# Patient Record
Sex: FEMALE | Race: WHITE | URBAN - METROPOLITAN AREA
[De-identification: names, ages, dates, MRNs, and addresses within clinical notes are randomized per-mention and may not be internally consistent; named-entity substitution may affect disease eponyms.]

---

## 2021-11-17 ENCOUNTER — OFFICE VISIT (OUTPATIENT)
Dept: BARIATRICS/WEIGHT MGMT | Age: 56
End: 2021-11-17
Payer: MEDICARE

## 2021-11-17 VITALS
DIASTOLIC BLOOD PRESSURE: 89 MMHG | SYSTOLIC BLOOD PRESSURE: 150 MMHG | WEIGHT: 103 LBS | BODY MASS INDEX: 16.17 KG/M2 | HEART RATE: 89 BPM | HEIGHT: 67 IN

## 2021-11-17 DIAGNOSIS — E44.0 MODERATE PROTEIN-CALORIE MALNUTRITION (HCC): ICD-10-CM

## 2021-11-17 DIAGNOSIS — N18.5 CHRONIC KIDNEY DISEASE WITH PERITONEAL DIALYSIS AS PREFERRED MODALITY, STAGE 5 (HCC): Primary | ICD-10-CM

## 2021-11-17 PROCEDURE — 99204 OFFICE O/P NEW MOD 45 MIN: CPT | Performed by: SURGERY

## 2021-11-17 PROCEDURE — G8427 DOCREV CUR MEDS BY ELIG CLIN: HCPCS | Performed by: SURGERY

## 2021-11-17 PROCEDURE — 3017F COLORECTAL CA SCREEN DOC REV: CPT | Performed by: SURGERY

## 2021-11-17 PROCEDURE — G8484 FLU IMMUNIZE NO ADMIN: HCPCS | Performed by: SURGERY

## 2021-11-17 PROCEDURE — 4004F PT TOBACCO SCREEN RCVD TLK: CPT | Performed by: SURGERY

## 2021-11-17 PROCEDURE — G8419 CALC BMI OUT NRM PARAM NOF/U: HCPCS | Performed by: SURGERY

## 2021-11-17 RX ORDER — HYDRALAZINE HYDROCHLORIDE 50 MG/1
TABLET, FILM COATED ORAL
COMMUNITY
Start: 2021-09-08

## 2021-11-17 RX ORDER — PANTOPRAZOLE SODIUM 40 MG/1
1 TABLET, DELAYED RELEASE ORAL
COMMUNITY
Start: 2021-10-15

## 2021-11-17 RX ORDER — METOPROLOL SUCCINATE 50 MG/1
TABLET, EXTENDED RELEASE ORAL
COMMUNITY
Start: 2021-09-28

## 2021-11-17 RX ORDER — NIFEDIPINE 90 MG/1
TABLET, FILM COATED, EXTENDED RELEASE ORAL
COMMUNITY
Start: 2021-10-11

## 2021-11-28 NOTE — PROGRESS NOTES
[facility]) Physicians   General & Laparoscopic Surgery  [employer]       HPI:  Matilde Mondragon is a very pleasant 61 y.o. female who is referred by nephrologist for consultation with regards PD catheter placement    Patient has CKD stage 5 requiring dialysis and prefers PD over HD        Past Medical History:   Diagnosis Date    Chronic kidney disease     Headache(784.0)     Joint pain     Seizures ([address]      Past Surgical History:   Procedure Laterality Date     SECTION       Family History   Problem Relation Age of Onset    Stroke Mother      Social History     Tobacco Use    Smoking status: Current Every Day Smoker     Types: Cigarettes    Smokeless tobacco: Never Used   Substance Use Topics    Alcohol use: Never     I counseled the patient on the importance of not smoking and risks of ETOH. Allergies   Allergen Reactions    Codeine Other (See Comments)     headaches    Contrast [Iodides]     Ibuprofen     Pcn [Penicillins]      Vitals:    21 1523   BP: (!) 150/89   Pulse: 89   Weight: 103 lb (46.7 kg)   Height: 5' 7\" (1.702 m)       Body mass index is 16.13 kg/m². Current Outpatient Medications:     metoprolol succinate (TOPROL XL) 50 MG extended release tablet, , Disp: , Rfl:     pantoprazole (PROTONIX) 40 MG tablet, Take 1 tablet by mouth, Disp: , Rfl:     NIFEdipine (ADALAT CC) 90 MG extended release tablet, , Disp: , Rfl:     hydrALAZINE (APRESOLINE) 50 MG tablet, , Disp: , Rfl:     milnacipran HCl (SAVELLA) 12.5 MG TABS, Take 1 tablet qhs for 1 week and then increase to one tablet bid, Disp: 60 tablet, Rfl: 0    topiramate (TOPAMAX) 100 MG tablet, Take 200 mg by mouth 2 times daily. , Disp: , Rfl:     omeprazole (PRILOSEC) 40 MG capsule, Take 40 mg by mouth daily. , Disp: , Rfl:     DULoxetine (CYMBALTA) 30 MG capsule, Take 1 capsule by mouth daily for 30 days. , Disp: 30 capsule, Rfl: 0    ROS  Review of Systems - History obtained from the patient  General ROS: negative  Psychological ROS: negative  Ophthalmic ROS: negative  Neurological ROS: negative  ENT ROS: negative  Allergy and Immunology ROS: negative  Hematological and Lymphatic ROS: negative  Endocrine ROS: negative  Respiratory ROS: negative  Cardiovascular ROS: negative  Gastrointestinal ROS:negative  Genito-Urinary ROS: negative  Musculoskeletal ROS: negative   Skin ROS:negative        Physical Exam   Constitutional: Patient is oriented to person, place, and time. Vital signs are normal. Patient  appears well-developed and well-nourished. Patient  is active and cooperative. Non-toxic appearance. No distress. HENT:   Head: Normocephalic and atraumatic. Head is without laceration. Right Ear: External ear normal. No lacerations. No drainage, swelling or tenderness. Left Ear: External ear normal. No lacerations. No drainage, swelling or tenderness. Nose: Nose normal. No nose lacerations or nasal deformity. Mouth/Throat: Uvula is midline, oropharynx is clear and moist and mucous membranes are normal. No oropharyngeal exudate. Eyes: Conjunctivae, EOM and lids are normal. Pupils are equal, round, and reactive to light. Right eye exhibits no discharge. No foreign body present in the right eye. Left eye exhibits no discharge. No foreign body present in the left eye. No scleral icterus. Neck: Trachea normal and normal range of motion. Neck supple. No JVD present. No tracheal tenderness present. Carotid bruit is not present. No rigidity. No tracheal deviation and no edema present. No thyromegaly present. Cardiovascular: Normal rate, regular rhythm, normal heart sounds, intact distal pulses and normal pulses. Pulmonary/Chest: Effort normal and breath sounds normal. No stridor. No respiratory distress. Patient  has no wheezes. Patient has no rales. Patient exhibits no tenderness and no crepitus. Abdominal: Soft.  Normal appearance and bowel sounds are normal. Patient exhibits no distension, no abdominal bruit, no ascites and no mass. There is no hepatosplenomegaly. There is no tenderness. There is no rigidity, no rebound, no guarding and no CVA tenderness. No hernia. Hernia confirmed negative in the ventral area. Musculoskeletal: Normal range of motion. Patient exhibits no edema or tenderness. Lymphadenopathy:        Head (right side): No submental, no submandibular, no preauricular, no posterior auricular and no occipital adenopathy present. Head (left side): No submental, no submandibular, no preauricular, no posterior auricular and no occipital adenopathy present. Patient  has no cervical adenopathy. Right: No supraclavicular adenopathy present. Left: No supraclavicular adenopathy present. Neurological: Patient is alert and oriented to person, place, and time. Patient has normal strength. Coordination and gait normal. GCS eye subscore is 4. GCS verbal subscore is 5. GCS motor subscore is 6. Skin: Skin is warm and dry. No abrasion and no rash noted. Patient  is not diaphoretic. No cyanosis or erythema. Psychiatric: Patient has a normal mood and affect. speech is normal and behavior is normal. Cognition and memory are normal.       Radiology:  I have personally reviewed the radiological studies and discussed it with the patient. A/P:  Eleni Polk is a very pleasant 61 y.o. female with symptomatic CKD stage 5 requiring dialysis    Patient prefers Lap PD cath placement    Risks and benefits explained and informed consent obtained. Risks include but not limited to; The possibilities of reaction to medication, pain, infection, bleeding, heart attack, death, injury to internal organs, seroma, chronic pain, nerve injury, open wound , scarring or need for further surgery. 1- Pre-op work up ordered. 2- Laparoscopic PD catheter placement  3- F/U with PCP for pre-op physical and Medical/Cardiac Clearance.    4- Will need to be off blood thinners for surgery for at least one week, , please discuss with your PCP or cardiologist.      Maxwell Tabor

## 2023-02-22 ENCOUNTER — OFFICE VISIT (OUTPATIENT)
Dept: BARIATRICS/WEIGHT MGMT | Age: 58
End: 2023-02-22

## 2023-02-22 VITALS
HEIGHT: 67 IN | WEIGHT: 115 LBS | BODY MASS INDEX: 18.05 KG/M2 | DIASTOLIC BLOOD PRESSURE: 67 MMHG | SYSTOLIC BLOOD PRESSURE: 151 MMHG | HEART RATE: 78 BPM

## 2023-02-22 DIAGNOSIS — Z99.2 ESRD (END STAGE RENAL DISEASE) ON DIALYSIS (HCC): Primary | ICD-10-CM

## 2023-02-22 DIAGNOSIS — E44.0 MODERATE PROTEIN-CALORIE MALNUTRITION (HCC): ICD-10-CM

## 2023-02-22 DIAGNOSIS — N18.6 ESRD (END STAGE RENAL DISEASE) ON DIALYSIS (HCC): Primary | ICD-10-CM

## 2023-02-22 RX ORDER — VITAMIN A 10000 UNIT
TABLET ORAL
COMMUNITY

## 2023-02-22 RX ORDER — SODIUM BICARBONATE 650 MG/1
2 TABLET ORAL
COMMUNITY
Start: 2021-07-09

## 2023-03-17 NOTE — PROGRESS NOTES
[facility] ([facility]) Physicians   General & Laparoscopic Surgery  [employer]       HPI:  Aaron Aragon is a very pleasant 61 y.o. female who is here to discuss PD catheter placement    She is ESRD on HD M/W/F    She gets headaches and labile BP with N/V    Would like to switch to PD        Past Medical History:   Diagnosis Date    Chronic kidney disease     Headache(784.0)     Joint pain     Seizures ([address]      Past Surgical History:   Procedure Laterality Date     SECTION      FISTULAGRAM       Family History   Problem Relation Age of Onset    Stroke Mother      Social History     Tobacco Use    Smoking status: Every Day     Types: Cigarettes    Smokeless tobacco: Never   Substance Use Topics    Alcohol use: Never     I counseled the patient on the importance of not smoking and risks of ETOH. Allergies   Allergen Reactions    Codeine Other (See Comments)     headaches    Contrast [Iodides]     Ibuprofen     Pcn [Penicillins]      Vitals:    23 1136   BP: (!) 151/67   Pulse: 78   Weight: 115 lb (52.2 kg)   Height: 5' 7\" (1.702 m)       Body mass index is 18.01 kg/m². Current Outpatient Medications:     Vitamin A 3 MG (68470 UT) TABS, Take by mouth, Disp: , Rfl:     sodium bicarbonate 650 MG tablet, Take 2 tablets by mouth, Disp: , Rfl:     metoprolol succinate (TOPROL XL) 50 MG extended release tablet, , Disp: , Rfl:     pantoprazole (PROTONIX) 40 MG tablet, Take 1 tablet by mouth, Disp: , Rfl:     NIFEdipine (ADALAT CC) 90 MG extended release tablet, , Disp: , Rfl:     hydrALAZINE (APRESOLINE) 50 MG tablet, , Disp: , Rfl:     topiramate (TOPAMAX) 100 MG tablet, Take 200 mg by mouth 2 times daily. , Disp: , Rfl:     omeprazole (PRILOSEC) 40 MG capsule, Take 40 mg by mouth daily. , Disp: , Rfl:     DULoxetine (CYMBALTA) 30 MG capsule, Take 1 capsule by mouth daily for 30 days. , Disp: 30 capsule, Rfl: 0    ROS  Review of Systems - History obtained from the patient  General ROS: negative  Psychological ROS: negative  Ophthalmic ROS: negative  Neurological ROS: negative  ENT ROS: negative  Allergy and Immunology ROS: negative  Hematological and Lymphatic ROS: negative  Endocrine ROS: negative  Respiratory ROS: negative  Cardiovascular ROS: negative  Gastrointestinal ROS:negative  Genito-Urinary ROS: negative  Musculoskeletal ROS: negative   Skin ROS:negative        Physical Exam   Constitutional: Patient is oriented to person, place, and time. Vital signs are normal. Patient  appears well-developed and well-nourished. Patient  is active and cooperative. Non-toxic appearance. No distress. Neck: Trachea normal and normal range of motion. Neck supple. No JVD present. No tracheal tenderness present. Carotid bruit is not present. No rigidity. No tracheal deviation and no edema present. No thyromegaly present. Cardiovascular: Normal rate, regular rhythm, normal heart sounds, intact distal pulses and normal pulses. Pulmonary/Chest: Effort normal and breath sounds normal. No stridor. No respiratory distress. Patient  has no wheezes. Patient has no rales. Patient exhibits no tenderness and no crepitus. Abdominal: Soft. Normal appearance and bowel sounds are normal. Patient exhibits no distension, no abdominal bruit, no ascites and no mass. There is no hepatosplenomegaly. There is no tenderness. There is no rigidity, no rebound, no guarding and no CVA tenderness. No hernia. Hernia confirmed negative in the ventral area. Musculoskeletal: Normal range of motion. Patient exhibits no edema or tenderness. Neurological: Patient is alert and oriented to person, place, and time. Patient has normal strength. Coordination and gait normal. GCS eye subscore is 4. GCS verbal subscore is 5. GCS motor subscore is 6. Skin: Skin is warm and dry. No abrasion and no rash noted. Patient  is not diaphoretic. No cyanosis or erythema. Psychiatric: Patient has a normal mood and affect.   speech is normal and behavior is normal. Cognition and memory are normal.     A/P:  Daiva Scale is a very pleasant 61 y.o. female with ESRD ON hd    Patient prefers Lap PD cath placement    Risks and benefits explained and informed consent obtained. Risks include but not limited to; The possibilities of reaction to medication, pain, infection, bleeding, heart attack, death, injury to internal organs, seroma, chronic pain, nerve injury, open wound , scarring or need for further surgery. 1- Pre-op work up ordered. 2- Laparoscopic PD catheter placement  3- F/U with PCP for pre-op physical and Medical Clearance.    4- Will need to be off blood thinners for surgery for at least one week, , please discuss with your PCP or cardiologist.      Sherri Calderón

## 2023-03-29 RX ORDER — TORSEMIDE 20 MG/1
20 TABLET ORAL DAILY
COMMUNITY

## 2023-04-03 ENCOUNTER — TELEPHONE (OUTPATIENT)
Dept: BARIATRICS/WEIGHT MGMT | Age: 58
End: 2023-04-03

## 2023-04-03 NOTE — TELEPHONE ENCOUNTER
Spoke with pt's spouse to confirm her 5:30 am arrival for her 7:15 am surgery on 4/4/23. Reminded that pt should be NPO. H&P obtained.

## 2023-04-04 ENCOUNTER — HOSPITAL ENCOUNTER (OUTPATIENT)
Age: 58
Setting detail: OUTPATIENT SURGERY
Discharge: HOME | End: 2023-04-04
Attending: SURGERY | Admitting: SURGERY
Payer: MEDICARE

## 2023-04-04 ENCOUNTER — ANESTHESIA EVENT (OUTPATIENT)
Dept: OPERATING ROOM | Age: 58
End: 2023-04-04
Payer: MEDICARE

## 2023-04-04 ENCOUNTER — ANESTHESIA (OUTPATIENT)
Dept: OPERATING ROOM | Age: 58
End: 2023-04-04
Payer: MEDICARE

## 2023-04-04 VITALS
HEIGHT: 67 IN | HEART RATE: 59 BPM | WEIGHT: 114.4 LBS | SYSTOLIC BLOOD PRESSURE: 147 MMHG | OXYGEN SATURATION: 98 % | TEMPERATURE: 96.6 F | DIASTOLIC BLOOD PRESSURE: 74 MMHG | RESPIRATION RATE: 15 BRPM | BODY MASS INDEX: 17.96 KG/M2

## 2023-04-04 DIAGNOSIS — G89.18 POST-OP PAIN: Primary | ICD-10-CM

## 2023-04-04 LAB
ANION GAP SERPL CALCULATED.3IONS-SCNC: 16 MMOL/L (ref 3–16)
BUN SERPL-MCNC: 38 MG/DL (ref 7–20)
CALCIUM SERPL-MCNC: 9.7 MG/DL (ref 8.3–10.6)
CHLORIDE SERPL-SCNC: 101 MMOL/L (ref 99–110)
CO2 SERPL-SCNC: 25 MMOL/L (ref 21–32)
CREAT SERPL-MCNC: 4.6 MG/DL (ref 0.6–1.1)
GFR SERPLBLD CREATININE-BSD FMLA CKD-EPI: 10 ML/MIN/{1.73_M2}
GLUCOSE SERPL-MCNC: 93 MG/DL (ref 70–99)
POTASSIUM SERPL-SCNC: 4.6 MMOL/L (ref 3.5–5.1)
SODIUM SERPL-SCNC: 142 MMOL/L (ref 136–145)

## 2023-04-04 PROCEDURE — 2500000003 HC RX 250 WO HCPCS: Performed by: NURSE ANESTHETIST, CERTIFIED REGISTERED

## 2023-04-04 PROCEDURE — 80048 BASIC METABOLIC PNL TOTAL CA: CPT

## 2023-04-04 PROCEDURE — 7100000011 HC PHASE II RECOVERY - ADDTL 15 MIN: Performed by: SURGERY

## 2023-04-04 PROCEDURE — C1750 CATH, HEMODIALYSIS,LONG-TERM: HCPCS | Performed by: SURGERY

## 2023-04-04 PROCEDURE — 6360000002 HC RX W HCPCS: Performed by: SURGERY

## 2023-04-04 PROCEDURE — A4217 STERILE WATER/SALINE, 500 ML: HCPCS | Performed by: SURGERY

## 2023-04-04 PROCEDURE — 6360000002 HC RX W HCPCS: Performed by: ANESTHESIOLOGY

## 2023-04-04 PROCEDURE — 3700000001 HC ADD 15 MINUTES (ANESTHESIA): Performed by: SURGERY

## 2023-04-04 PROCEDURE — 6370000000 HC RX 637 (ALT 250 FOR IP): Performed by: SURGERY

## 2023-04-04 PROCEDURE — 6360000002 HC RX W HCPCS

## 2023-04-04 PROCEDURE — 3700000000 HC ANESTHESIA ATTENDED CARE: Performed by: SURGERY

## 2023-04-04 PROCEDURE — 2580000003 HC RX 258: Performed by: SURGERY

## 2023-04-04 PROCEDURE — 2709999900 HC NON-CHARGEABLE SUPPLY: Performed by: SURGERY

## 2023-04-04 PROCEDURE — 7100000000 HC PACU RECOVERY - FIRST 15 MIN: Performed by: SURGERY

## 2023-04-04 PROCEDURE — 2580000003 HC RX 258: Performed by: ANESTHESIOLOGY

## 2023-04-04 PROCEDURE — 2500000003 HC RX 250 WO HCPCS: Performed by: SURGERY

## 2023-04-04 PROCEDURE — 7100000010 HC PHASE II RECOVERY - FIRST 15 MIN: Performed by: SURGERY

## 2023-04-04 PROCEDURE — 7100000001 HC PACU RECOVERY - ADDTL 15 MIN: Performed by: SURGERY

## 2023-04-04 PROCEDURE — 3600000014 HC SURGERY LEVEL 4 ADDTL 15MIN: Performed by: SURGERY

## 2023-04-04 PROCEDURE — 6360000002 HC RX W HCPCS: Performed by: NURSE ANESTHETIST, CERTIFIED REGISTERED

## 2023-04-04 PROCEDURE — 3600000004 HC SURGERY LEVEL 4 BASE: Performed by: SURGERY

## 2023-04-04 DEVICE — IMPLANTABLE DEVICE: Type: IMPLANTABLE DEVICE | Status: FUNCTIONAL

## 2023-04-04 RX ORDER — HEPARIN SODIUM 5000 [USP'U]/ML
5000 INJECTION, SOLUTION INTRAVENOUS; SUBCUTANEOUS ONCE
Status: COMPLETED | OUTPATIENT
Start: 2023-04-04 | End: 2023-04-04

## 2023-04-04 RX ORDER — ONDANSETRON 2 MG/ML
INJECTION INTRAMUSCULAR; INTRAVENOUS PRN
Status: DISCONTINUED | OUTPATIENT
Start: 2023-04-04 | End: 2023-04-04 | Stop reason: SDUPTHER

## 2023-04-04 RX ORDER — ROCURONIUM BROMIDE 10 MG/ML
INJECTION, SOLUTION INTRAVENOUS PRN
Status: DISCONTINUED | OUTPATIENT
Start: 2023-04-04 | End: 2023-04-04 | Stop reason: SDUPTHER

## 2023-04-04 RX ORDER — MIDAZOLAM HYDROCHLORIDE 1 MG/ML
INJECTION INTRAMUSCULAR; INTRAVENOUS PRN
Status: DISCONTINUED | OUTPATIENT
Start: 2023-04-04 | End: 2023-04-04 | Stop reason: SDUPTHER

## 2023-04-04 RX ORDER — IPRATROPIUM BROMIDE AND ALBUTEROL SULFATE 2.5; .5 MG/3ML; MG/3ML
1 SOLUTION RESPIRATORY (INHALATION)
Status: DISCONTINUED | OUTPATIENT
Start: 2023-04-04 | End: 2023-04-04 | Stop reason: HOSPADM

## 2023-04-04 RX ORDER — PROPOFOL 10 MG/ML
INJECTION, EMULSION INTRAVENOUS PRN
Status: DISCONTINUED | OUTPATIENT
Start: 2023-04-04 | End: 2023-04-04 | Stop reason: SDUPTHER

## 2023-04-04 RX ORDER — SODIUM CHLORIDE 9 MG/ML
25 INJECTION, SOLUTION INTRAVENOUS PRN
Status: DISCONTINUED | OUTPATIENT
Start: 2023-04-04 | End: 2023-04-04 | Stop reason: HOSPADM

## 2023-04-04 RX ORDER — FENTANYL CITRATE 50 UG/ML
50 INJECTION, SOLUTION INTRAMUSCULAR; INTRAVENOUS EVERY 5 MIN PRN
Status: DISCONTINUED | OUTPATIENT
Start: 2023-04-04 | End: 2023-04-04 | Stop reason: HOSPADM

## 2023-04-04 RX ORDER — HEPARIN SODIUM (PORCINE) LOCK FLUSH IV SOLN 100 UNIT/ML 100 UNIT/ML
SOLUTION INTRAVENOUS PRN
Status: DISCONTINUED | OUTPATIENT
Start: 2023-04-04 | End: 2023-04-04 | Stop reason: HOSPADM

## 2023-04-04 RX ORDER — LABETALOL HYDROCHLORIDE 5 MG/ML
10 INJECTION, SOLUTION INTRAVENOUS
Status: DISCONTINUED | OUTPATIENT
Start: 2023-04-04 | End: 2023-04-04 | Stop reason: HOSPADM

## 2023-04-04 RX ORDER — SODIUM CHLORIDE 0.9 % (FLUSH) 0.9 %
5-40 SYRINGE (ML) INJECTION PRN
Status: DISCONTINUED | OUTPATIENT
Start: 2023-04-04 | End: 2023-04-04 | Stop reason: HOSPADM

## 2023-04-04 RX ORDER — METOCLOPRAMIDE HYDROCHLORIDE 5 MG/ML
10 INJECTION INTRAMUSCULAR; INTRAVENOUS
Status: DISCONTINUED | OUTPATIENT
Start: 2023-04-04 | End: 2023-04-04 | Stop reason: HOSPADM

## 2023-04-04 RX ORDER — ONDANSETRON 2 MG/ML
4 INJECTION INTRAMUSCULAR; INTRAVENOUS
Status: DISCONTINUED | OUTPATIENT
Start: 2023-04-04 | End: 2023-04-04 | Stop reason: HOSPADM

## 2023-04-04 RX ORDER — MAGNESIUM CARB/ALUMINUM HYDROX 105-160MG
TABLET,CHEWABLE ORAL PRN
Status: DISCONTINUED | OUTPATIENT
Start: 2023-04-04 | End: 2023-04-04 | Stop reason: HOSPADM

## 2023-04-04 RX ORDER — MAGNESIUM HYDROXIDE 1200 MG/15ML
LIQUID ORAL CONTINUOUS PRN
Status: DISCONTINUED | OUTPATIENT
Start: 2023-04-04 | End: 2023-04-04 | Stop reason: HOSPADM

## 2023-04-04 RX ORDER — SODIUM CHLORIDE 0.9 % (FLUSH) 0.9 %
5-40 SYRINGE (ML) INJECTION EVERY 12 HOURS SCHEDULED
Status: DISCONTINUED | OUTPATIENT
Start: 2023-04-04 | End: 2023-04-04 | Stop reason: HOSPADM

## 2023-04-04 RX ORDER — DOCUSATE SODIUM 100 MG/1
100 CAPSULE, LIQUID FILLED ORAL 2 TIMES DAILY
Qty: 30 CAPSULE | Refills: 0 | Status: SHIPPED | OUTPATIENT
Start: 2023-04-04 | End: 2023-04-18

## 2023-04-04 RX ORDER — LIDOCAINE HYDROCHLORIDE 20 MG/ML
INJECTION, SOLUTION INTRAVENOUS PRN
Status: DISCONTINUED | OUTPATIENT
Start: 2023-04-04 | End: 2023-04-04 | Stop reason: SDUPTHER

## 2023-04-04 RX ORDER — OXYCODONE HYDROCHLORIDE 5 MG/1
5 TABLET ORAL EVERY 6 HOURS PRN
Qty: 28 TABLET | Refills: 0 | Status: SHIPPED | OUTPATIENT
Start: 2023-04-04 | End: 2023-04-11

## 2023-04-04 RX ORDER — LIDOCAINE HYDROCHLORIDE 10 MG/ML
1 INJECTION, SOLUTION EPIDURAL; INFILTRATION; INTRACAUDAL; PERINEURAL
Status: DISCONTINUED | OUTPATIENT
Start: 2023-04-04 | End: 2023-04-04 | Stop reason: HOSPADM

## 2023-04-04 RX ORDER — SODIUM CHLORIDE, SODIUM LACTATE, POTASSIUM CHLORIDE, CALCIUM CHLORIDE 600; 310; 30; 20 MG/100ML; MG/100ML; MG/100ML; MG/100ML
INJECTION, SOLUTION INTRAVENOUS CONTINUOUS
Status: DISCONTINUED | OUTPATIENT
Start: 2023-04-04 | End: 2023-04-04 | Stop reason: HOSPADM

## 2023-04-04 RX ORDER — FENTANYL CITRATE 50 UG/ML
INJECTION, SOLUTION INTRAMUSCULAR; INTRAVENOUS PRN
Status: DISCONTINUED | OUTPATIENT
Start: 2023-04-04 | End: 2023-04-04 | Stop reason: SDUPTHER

## 2023-04-04 RX ORDER — GLYCOPYRROLATE 0.2 MG/ML
INJECTION INTRAMUSCULAR; INTRAVENOUS PRN
Status: DISCONTINUED | OUTPATIENT
Start: 2023-04-04 | End: 2023-04-04 | Stop reason: SDUPTHER

## 2023-04-04 RX ORDER — SODIUM CHLORIDE 9 MG/ML
INJECTION, SOLUTION INTRAVENOUS CONTINUOUS
Status: DISCONTINUED | OUTPATIENT
Start: 2023-04-04 | End: 2023-04-04 | Stop reason: HOSPADM

## 2023-04-04 RX ORDER — BUPIVACAINE HYDROCHLORIDE 5 MG/ML
INJECTION, SOLUTION EPIDURAL; INTRACAUDAL PRN
Status: DISCONTINUED | OUTPATIENT
Start: 2023-04-04 | End: 2023-04-04 | Stop reason: HOSPADM

## 2023-04-04 RX ORDER — DEXAMETHASONE SODIUM PHOSPHATE 4 MG/ML
INJECTION, SOLUTION INTRA-ARTICULAR; INTRALESIONAL; INTRAMUSCULAR; INTRAVENOUS; SOFT TISSUE PRN
Status: DISCONTINUED | OUTPATIENT
Start: 2023-04-04 | End: 2023-04-04 | Stop reason: SDUPTHER

## 2023-04-04 RX ORDER — MIDAZOLAM HYDROCHLORIDE 1 MG/ML
2 INJECTION INTRAMUSCULAR; INTRAVENOUS
Status: DISCONTINUED | OUTPATIENT
Start: 2023-04-04 | End: 2023-04-04 | Stop reason: HOSPADM

## 2023-04-04 RX ADMIN — LIDOCAINE HYDROCHLORIDE 60 MG: 20 INJECTION, SOLUTION INTRAVENOUS at 07:18

## 2023-04-04 RX ADMIN — LIDOCAINE HYDROCHLORIDE 20 MG: 20 INJECTION, SOLUTION INTRAVENOUS at 08:13

## 2023-04-04 RX ADMIN — HEPARIN SODIUM 5000 UNITS: 5000 INJECTION INTRAVENOUS; SUBCUTANEOUS at 06:44

## 2023-04-04 RX ADMIN — ROCURONIUM BROMIDE 25 MG: 10 INJECTION INTRAVENOUS at 07:18

## 2023-04-04 RX ADMIN — HYDROMORPHONE HYDROCHLORIDE 0.25 MG: 1 INJECTION, SOLUTION INTRAMUSCULAR; INTRAVENOUS; SUBCUTANEOUS at 09:40

## 2023-04-04 RX ADMIN — DEXAMETHASONE SODIUM PHOSPHATE 4 MG: 4 INJECTION, SOLUTION INTRAMUSCULAR; INTRAVENOUS at 07:55

## 2023-04-04 RX ADMIN — GLYCOPYRROLATE 0.2 MG: 0.2 INJECTION INTRAMUSCULAR; INTRAVENOUS at 07:54

## 2023-04-04 RX ADMIN — SODIUM CHLORIDE: 9 INJECTION, SOLUTION INTRAVENOUS at 06:33

## 2023-04-04 RX ADMIN — PROPOFOL 20 MG: 10 INJECTION, EMULSION INTRAVENOUS at 08:13

## 2023-04-04 RX ADMIN — ONDANSETRON 4 MG: 2 INJECTION INTRAMUSCULAR; INTRAVENOUS at 07:55

## 2023-04-04 RX ADMIN — HYDROMORPHONE HYDROCHLORIDE 0.25 MG: 1 INJECTION, SOLUTION INTRAMUSCULAR; INTRAVENOUS; SUBCUTANEOUS at 09:35

## 2023-04-04 RX ADMIN — PROPOFOL 100 MG: 10 INJECTION, EMULSION INTRAVENOUS at 07:18

## 2023-04-04 RX ADMIN — FENTANYL CITRATE 50 MCG: 50 INJECTION, SOLUTION INTRAMUSCULAR; INTRAVENOUS at 08:15

## 2023-04-04 RX ADMIN — FENTANYL CITRATE 50 MCG: 50 INJECTION, SOLUTION INTRAMUSCULAR; INTRAVENOUS at 07:38

## 2023-04-04 RX ADMIN — MIDAZOLAM HYDROCHLORIDE 2 MG: 2 INJECTION, SOLUTION INTRAMUSCULAR; INTRAVENOUS at 07:11

## 2023-04-04 RX ADMIN — VANCOMYCIN HYDROCHLORIDE 1000 MG: 10 INJECTION, POWDER, LYOPHILIZED, FOR SOLUTION INTRAVENOUS at 07:11

## 2023-04-04 RX ADMIN — ROCURONIUM BROMIDE 5 MG: 10 INJECTION INTRAVENOUS at 07:17

## 2023-04-04 ASSESSMENT — PAIN SCALES - GENERAL
PAINLEVEL_OUTOF10: 4
PAINLEVEL_OUTOF10: 0
PAINLEVEL_OUTOF10: 1
PAINLEVEL_OUTOF10: 6
PAINLEVEL_OUTOF10: 4

## 2023-04-04 ASSESSMENT — PAIN DESCRIPTION - ORIENTATION
ORIENTATION: LEFT;UPPER
ORIENTATION: LEFT

## 2023-04-04 ASSESSMENT — PAIN - FUNCTIONAL ASSESSMENT: PAIN_FUNCTIONAL_ASSESSMENT: 0-10

## 2023-04-04 ASSESSMENT — PAIN DESCRIPTION - LOCATION
LOCATION: ABDOMEN
LOCATION: ABDOMEN

## 2023-04-04 ASSESSMENT — LIFESTYLE VARIABLES: SMOKING_STATUS: 1

## 2023-04-04 ASSESSMENT — PAIN DESCRIPTION - DESCRIPTORS: DESCRIPTORS: ACHING

## 2023-04-04 NOTE — PROGRESS NOTES
Advised to speak with Dr Misha Hightower because she is questioning having procedure. Late entry: Dr Misha Hightower made aware of  DNR order, suspension of DNR and above. When he came in room she said she did not have anything to discuss with him after I asked her if she had anything to discuss with Dr Misha Hightower. She had just finished a conversation about above with spouse and he said he would have the surgery if it was him since you  won't have to go to a  Dialysis Unit. No no sleeve on left arm.
Ambulatory Surgery/Procedure Discharge Note    Vitals:    04/04/23 1008   BP: (!) 147/74   Pulse: 59   Resp: 15   Temp: (!) 96.6 °F (35.9 °C)   SpO2: 98%     Pt meets discharge criteria per Dotty score. In: 750 [I.V.:500]  Out: 76 [Urine:75]    Restroom use offered before discharge. Yes    Pain assessment:  none  Pain Level: 4 (after pain meds)    Pt and S.O./family states \"ready to go home\". Pt alert and oriented x4. IV removed. Denies N/V or pain. Dressing to L abdomen, c/d/I. Discharge instructions given to pt and  with pt permission. Pt and  verbalized understanding of all instructions. Left with all belongings, 2 prescriptions, and discharge instructions. Patient discharged to home/self care.  Patient discharged via wheel chair by transporter to waiting family/S.O.       4/4/2023 10:42 AM
PACU Transfer to [facility]    Vitals:    04/04/23 0945   BP: (!) 160/80   Pulse: 66   Resp: 15   Temp: 96.8 °F (36 °C)   SpO2: 100%         Intake/Output Summary (Last 24 hours) at 4/4/2023 1002  Last data filed at 4/4/2023 0824  Gross per 24 hour   Intake 750 ml   Output 75 ml   Net 675 ml       Pain assessment:  present - adequately treated  Pain Level: 4 (after pain meds)    Patient transferred to care of ANAND RN.    4/4/2023 10:02 AM
Place patient label inside box (if no patient label, complete below)  Name:  :    MR#:   Mary Caro / PROCEDURE  I (we) Kentrell Gurrola (Patient Name) authorize Isidro Borden DO (Provider / Pawel Tubbs) and/or such assistants as may be selected by him/her, to perform the following operation/procedure(s): LAPAROSCOPIC PERITONEAL DIALYSIS CATHETER PLACEMENT       Note: If unable to obtain consent prior to an emergent procedure, document the emergent reason in the medical record. This procedure has been explained to my (our) satisfaction and included in the explanation was: The intended benefit, nature, and extent of the procedure to be performed; The significant risks involved and the probability of success; Alternative procedures and methods of treatment; The dangers and probable consequences of such alternatives (including no procedure or treatment); The expected consequences of the procedure on my future health; Whether other qualified individuals would be performing important surgical tasks and/or whether  would be present to advise or support the procedure. I (we) understand that there are other risks of infection and other serious complications in the pre-operative/procedural and postoperative/procedural stages of my (our) care. I (we) have asked all of the questions which I (we) thought were important in deciding whether or not to undergo treatment or diagnosis. These questions have been answered to my (our) satisfaction. I (we) understand that no assurance can be given that the procedure will be a success, and no guarantee or warranty of success has been given to me (us). It has been explained to me (us) that during the course of the operation/procedure, unforeseen conditions may be revealed that necessitate extension of the original procedure(s) or different procedure(s) than those set forth in Paragraph 1.  I (we) authorize and
States dialysis is 2 days a week for 3 hours.  Last was yesterday  She states what is ordered is 3 for 3.5 hours
patterns. Nausea, headache, muscle aches, or sore throat may also occur after anesthesia. Your nurse will help you manage these potential side effects. 2. For comfort and safety, arrange to have someone at home with you for the first 24 hours after discharge. 3. You and your family will be given written instructions about your diet, activity, dressing care, medications, and return visits. 4. Once at home, should issues with nausea, pain, or bleeding occur, or should you notice any signs of infection, you should call your surgeon. 5. Always clean your hands before and after caring for your wound. Do not let your family touch your surgery site without cleaning their hands. 6. Narcotic pain medications can cause significant constipation. You may want to add a stool softener to your postoperative medication schedule or speak to your surgeon on how best to manage this SIDE EFFECT. SPECIAL INSTRUCTIONS :  Patient verbalized understanding of pre-op instructions. Her questions were answered. Thank you for allowing us to care for you. We strive to exceed your expectations in the delivery of care and service provided to you and your family. If you need to contact the [place] [id number] staff for any reason, please call us at [phone]    Instructions reviewed with patient during preadmission testing phone interview. Houston Velasquez RN.3/29/2023 .10:25 AM      ADDITIONAL EDUCATIONAL INFORMATION REVIEWED PER PHONE WITH YOU AND/OR YOUR FAMILY:  Yes Antibacterial Soap:  Patient encouraged to use antibacterial soap (Dial or Safeguard) when showering for several days prior to procedure.

## 2023-04-04 NOTE — ANESTHESIA PRE PROCEDURE
Plan      general     ASA 3       Induction: intravenous. MIPS: Postoperative opioids intended and Prophylactic antiemetics administered. Anesthetic plan and risks discussed with patient. Plan discussed with CRNA.                     Kian Alarcon MD   4/4/2023

## 2023-04-04 NOTE — ANESTHESIA POSTPROCEDURE EVALUATION
Department of Anesthesiology  Postprocedure Note    Patient: Enedina Adams  MRN: [id number]  YOB: 1965  Date of evaluation: 4/4/2023      Procedure Summary     Date: 04/04/23 Room / Location: [address] [place] [address]    Anesthesia Start: 4495 Anesthesia Stop: 9828    Procedure: LAPAROSCOPIC PERITONEAL DIALYSIS CATHETER PLACEMENT, LAPAROSCOPIC OMENTOPEXY (Abdomen) Diagnosis:       ESRD (end stage renal disease) ([facility]      (ESRD (end stage renal disease) ([facility] [N18.6])    Surgeons: Vlad Rodriguez DO Responsible Provider: Suly Madrid MD    Anesthesia Type: general ASA Status: 3          Anesthesia Type: No value filed.     Dotty Phase I: Dotty Score: 10    Dotty Phase II: Dotty Score: 10      Anesthesia Post Evaluation    Patient location during evaluation: PACU  Level of consciousness: awake and alert  Airway patency: patent  Nausea & Vomiting: no vomiting  Complications: no  Cardiovascular status: blood pressure returned to baseline  Respiratory status: acceptable  Hydration status: euvolemic  Multimodal analgesia pain management approach

## 2023-04-04 NOTE — H&P
Update History & Physical    The patient's History and Physical of March 28, 2023 was reviewed with the patient and I examined the patient. There was no change. The surgical site was confirmed by the patient and me. Plan: The risks, benefits, expected outcome, and alternative to the recommended procedure have been discussed with the patient. Patient understands and wants to proceed with the procedure.      Electronically signed by Mahin Bermudez DO on 4/4/2023 at 6:51 AM

## 2023-04-04 NOTE — OP NOTE
was instilled into the abdomen and removed without difficulty. One last look laparoscopically was performed after catheter was clipped, clamped, and heparin locked. The catheter was seen to be appropriately going down in the pelvis and omentopexy was seen to be hemostatic. Pneumoperitoneum was evacuated. The patient tolerated the procedure well and was brought to the postanesthesia care unit in stable condition. All sponge, needle, and instrument counts were correct x2. I personally spoke to the patient's family at the end of the procedure.

## 2023-04-04 NOTE — DISCHARGE INSTRUCTIONS
However, if you experience nausea or repeated episodes of vomiting which persist beyond 12-24 hours, notify your physician. Once nausea has passed, remember to keep drinking fluids. Difficulty Passing Urine  [x]Drink extra amounts of fluid today. Notify your physician if you have not urinated within 8 hours after your procedure or you feel uncomfortable. Irritated Throat from a Breathing Tube  [x]Drink extra amounts of fluid today. Lozenges may help. Muscle Aches  [x]You may experience some generalized body aches as your muscles recover from medications used to relax them during surgery. These will gradually subside. MEDICATION INSTRUCTIONS:  []Prescription(S) x     sent with you. Use as directed. When taking pain medications, you may experience the side effect of dizziness or drowsiness. Do not drink alcohol or drive when taking these medications. []Prescription(S) x          Called to Pharmacy Name and location:    [x]Give the list of your medications to your primary care physician on your next visit. Keep your med list updated and carry it with in case of emergencies. [] Narcotic pain medications can cause the side effect of significant constipation. You may want to add a stool softener to your postoperative medication schedule or speak to your surgeon on how best to manage this side effect. NARCOTIC SAFETY:  Your pain medicine is only for you to take. Safely store your medicines. Store pills up high and out of reach of children and pets. Ensure safety caps are snapped tightly  Keep track of how many pills you have left    Unused medication can be disposed of by taking them to a drop-off box or take-back program that is authorized by the [place]. Access to a site near you can be found on the [facility] [employer] website ([address] [facility].gov).     If you have a CPAP machine, it is very important that you use it daily during all periods of sleep and daytime rest during

## 2023-04-04 NOTE — BRIEF OP NOTE
Brief Postoperative Note      Patient: Jaiem Shah  YOB: 1965  MRN: [id number]    Date of Procedure: 4/4/2023    Pre-Op Diagnosis: [facility] [N18.6]    Post-Op Diagnosis: Same       Procedure(s):  LAPAROSCOPIC PERITONEAL DIALYSIS CATHETER PLACEMENT, LAPAROSCOPIC OMENTOPEXY    Surgeon(s):   Eleanor Garcia DO    Assistant:  Resident: Nighat Walsh MD    Anesthesia: General    Estimated Blood Loss (mL): Minimal    Complications: None    Specimens:   * No specimens in log *    Implants:  * No implants in log *      Drains:   [REMOVED] Urinary Catheter 04/04/23 [place] (Removed)       Findings: uncomplicated left sided PD catheter placement with omentopexy     Electronically signed by Kevin Almeida MD on 4/4/2023 at 8:13 AM

## 2023-04-19 ENCOUNTER — TELEPHONE (OUTPATIENT)
Dept: BARIATRICS/WEIGHT MGMT | Age: 58
End: 2023-04-19

## 2023-04-19 ENCOUNTER — OFFICE VISIT (OUTPATIENT)
Dept: BARIATRICS/WEIGHT MGMT | Age: 58
End: 2023-04-19

## 2023-04-19 VITALS
HEART RATE: 78 BPM | DIASTOLIC BLOOD PRESSURE: 80 MMHG | HEIGHT: 67 IN | BODY MASS INDEX: 18.05 KG/M2 | WEIGHT: 115 LBS | SYSTOLIC BLOOD PRESSURE: 145 MMHG

## 2023-04-19 DIAGNOSIS — N18.6 ESRD (END STAGE RENAL DISEASE) ON DIALYSIS (HCC): Primary | ICD-10-CM

## 2023-04-19 DIAGNOSIS — Z99.2 ESRD (END STAGE RENAL DISEASE) ON DIALYSIS (HCC): Primary | ICD-10-CM

## 2023-04-19 DIAGNOSIS — R68.84 JAW PAIN: ICD-10-CM

## 2023-04-19 PROCEDURE — 99024 POSTOP FOLLOW-UP VISIT: CPT | Performed by: SURGERY

## 2023-04-19 NOTE — PROGRESS NOTES
Patient doing well except for c/o right jaw pain   No N/V/F/C  Tolerating diet  Having BM's    Incisions C/D/I    Ok to use catheter     Has not been flushed yet    Will f/u if not functioning    Referred to Dr. Eyal Moyer ENT for jaw pain    Nena Phelan

## 2023-04-19 NOTE — TELEPHONE ENCOUNTER
Patient was seen today for 2wk PD Cath follow-up    Received a call from [facility]. home Dialysis nurse stating patient's PD Cath is not working. Attempted Heparin power flush but nothing push through. Martha stating \"it's like hitting a brick wall\"     This is the first time using the cath.      Message sent to Dr. Ed Scherer

## 2023-04-20 NOTE — TELEPHONE ENCOUNTER
Was unable to reach pt, lvm for pt to call office to relay provider message. Dr Siddhartha Wright did discuss with the nurse supervisor, they think that it is a fibrin plug (which is caused by not flushing often), and they will attempt to fix it. If they are not able to fix or flush properly, then pt will need a PD cath revision (will take out current one and replace with a new one). Lilli Skates if pt has any further questions, please let us know.     Thank you

## 2023-04-20 NOTE — TELEPHONE ENCOUNTER
Per provider, no need to follow. Provider spoke to dialysis center, and they stated they would contact office with any issues.

## 2024-09-20 ENCOUNTER — HOSPITAL ENCOUNTER (EMERGENCY)
Age: 59
Discharge: HOME | End: 2024-09-20
Attending: EMERGENCY MEDICINE
Payer: MEDICARE

## 2024-09-20 ENCOUNTER — APPOINTMENT (OUTPATIENT)
Age: 59
End: 2024-09-20
Payer: MEDICARE

## 2024-09-20 VITALS
RESPIRATION RATE: 16 BRPM | DIASTOLIC BLOOD PRESSURE: 89 MMHG | SYSTOLIC BLOOD PRESSURE: 160 MMHG | HEIGHT: 67 IN | TEMPERATURE: 98.3 F | BODY MASS INDEX: 16.95 KG/M2 | WEIGHT: 108 LBS | OXYGEN SATURATION: 97 % | HEART RATE: 68 BPM

## 2024-09-20 DIAGNOSIS — R03.0 ELEVATED BLOOD PRESSURE READING: ICD-10-CM

## 2024-09-20 DIAGNOSIS — S93.602A FOOT SPRAIN, LEFT, INITIAL ENCOUNTER: Primary | ICD-10-CM

## 2024-09-20 PROCEDURE — 73630 X-RAY EXAM OF FOOT: CPT

## 2024-09-20 PROCEDURE — 99283 EMERGENCY DEPT VISIT LOW MDM: CPT

## 2024-09-20 ASSESSMENT — PAIN DESCRIPTION - DESCRIPTORS: DESCRIPTORS: DISCOMFORT

## 2024-09-20 ASSESSMENT — PAIN - FUNCTIONAL ASSESSMENT: PAIN_FUNCTIONAL_ASSESSMENT: 0-10

## 2024-09-20 ASSESSMENT — PAIN DESCRIPTION - ORIENTATION: ORIENTATION: LEFT

## 2024-09-20 ASSESSMENT — PAIN DESCRIPTION - LOCATION: LOCATION: FOOT

## 2024-09-20 ASSESSMENT — PAIN SCALES - GENERAL: PAINLEVEL_OUTOF10: 2

## 2024-10-30 ENCOUNTER — HOSPITAL ENCOUNTER (INPATIENT)
Age: 59
LOS: 1 days | Discharge: LEFT BEFORE DISCHARGE | DRG: 391 | End: 2024-10-31
Attending: EMERGENCY MEDICINE | Admitting: INTERNAL MEDICINE
Payer: MEDICARE

## 2024-10-30 ENCOUNTER — APPOINTMENT (OUTPATIENT)
Age: 59
DRG: 391 | End: 2024-10-30
Payer: MEDICARE

## 2024-10-30 DIAGNOSIS — N18.6 ESRD ON PERITONEAL DIALYSIS (HCC): ICD-10-CM

## 2024-10-30 DIAGNOSIS — K52.9 ACUTE HEMORRHAGIC COLITIS: Primary | ICD-10-CM

## 2024-10-30 DIAGNOSIS — Z99.2 ESRD ON PERITONEAL DIALYSIS (HCC): ICD-10-CM

## 2024-10-30 LAB
ALBUMIN SERPL-MCNC: 4.2 G/DL (ref 3.4–5)
ALBUMIN/GLOB SERPL: 1.7 {RATIO}
ALP SERPL-CCNC: 116 U/L (ref 40–129)
ALT SERPL-CCNC: <5 U/L (ref 10–40)
ANION GAP SERPL CALCULATED.3IONS-SCNC: 13 MMOL/L (ref 3–16)
AST SERPL-CCNC: 15 U/L (ref 15–37)
BASOPHILS # BLD: 0.02 K/UL (ref 0–0.2)
BASOPHILS NFR BLD: 0 %
BILIRUB SERPL-MCNC: <0.2 MG/DL (ref 0–1)
BILIRUB UR QL STRIP: NEGATIVE
BUN SERPL-MCNC: 56 MG/DL (ref 7–20)
CALCIUM SERPL-MCNC: 9.6 MG/DL (ref 8.3–10.6)
CHARACTER UR: ABNORMAL
CHLORIDE SERPL-SCNC: 110 MMOL/L (ref 99–110)
CLARITY UR: CLEAR
CO2 SERPL-SCNC: 17 MMOL/L (ref 21–32)
COLOR UR: YELLOW
CREAT SERPL-MCNC: 3.3 MG/DL (ref 0.6–1.2)
DATE, STOOL #1: ABNORMAL
EOSINOPHIL # BLD: 0.18 K/UL (ref 0–0.6)
EOSINOPHILS RELATIVE PERCENT: 2 %
EPI CELLS #/AREA URNS HPF: ABNORMAL /HPF
ERYTHROCYTE [DISTWIDTH] IN BLOOD BY AUTOMATED COUNT: 13.8 % (ref 12.4–15.4)
GFR, ESTIMATED: 16 ML/MIN/1.73M2
GLUCOSE SERPL-MCNC: 111 MG/DL (ref 70–99)
GLUCOSE UR STRIP-MCNC: NEGATIVE MG/DL
HCT VFR BLD AUTO: 36.4 % (ref 36–48)
HEMOCCULT SP1 STL QL: POSITIVE
HGB BLD-MCNC: 11.5 G/DL (ref 12–16)
HGB UR QL STRIP.AUTO: NEGATIVE
IMM GRANULOCYTES # BLD AUTO: 0.02 K/UL (ref 0–0.5)
IMM GRANULOCYTES NFR BLD: 0 %
INR PPP: 1 (ref 0.9–1.2)
KETONES UR STRIP-MCNC: NEGATIVE MG/DL
LACTATE BLDV-SCNC: 0.5 MMOL/L (ref 0.4–1.9)
LACTATE BLDV-SCNC: 0.6 MMOL/L (ref 0.4–1.9)
LEUKOCYTE ESTERASE UR QL STRIP: NEGATIVE
LIPASE SERPL-CCNC: 69 U/L (ref 13–60)
LYMPHOCYTES NFR BLD: 1.07 K/UL (ref 1–5.1)
LYMPHOCYTES RELATIVE PERCENT: 12 %
MCH RBC QN AUTO: 32.3 PG (ref 26–34)
MCHC RBC AUTO-ENTMCNC: 31.6 G/DL (ref 31–36)
MCV RBC AUTO: 102.2 FL (ref 80–100)
MONOCYTES NFR BLD: 0.58 K/UL (ref 0–1.3)
MONOCYTES NFR BLD: 7 %
NEUTROPHILS NFR BLD: 79 %
NEUTS SEG NFR BLD: 6.91 K/UL (ref 1.7–7.7)
NITRITE UR QL STRIP: NEGATIVE
PH UR STRIP: 6 [PH] (ref 5–8)
PLATELET # BLD AUTO: 266 K/UL (ref 135–450)
PMV BLD AUTO: 9.5 FL
POTASSIUM SERPL-SCNC: 4.7 MMOL/L (ref 3.5–5.1)
PROT SERPL-MCNC: 6.7 G/DL (ref 6.4–8.2)
PROT UR STRIP-MCNC: ABNORMAL MG/DL
PROTHROMBIN TIME: 13.8 SEC (ref 11.9–14.9)
RBC # BLD AUTO: 3.56 M/UL (ref 4–5.2)
RBC #/AREA URNS HPF: ABNORMAL /HPF
SODIUM SERPL-SCNC: 140 MMOL/L (ref 136–145)
SP GR UR STRIP: 1.01 (ref 1–1.03)
TIME, STOOL #1: 1040
UROBILINOGEN UR STRIP-ACNC: 0.2 EU/DL (ref 0–1)
WBC #/AREA URNS HPF: ABNORMAL /HPF
WBC OTHER # BLD: 8.8 K/UL (ref 4–11)

## 2024-10-30 PROCEDURE — 6370000000 HC RX 637 (ALT 250 FOR IP): Performed by: INTERNAL MEDICINE

## 2024-10-30 PROCEDURE — 99285 EMERGENCY DEPT VISIT HI MDM: CPT

## 2024-10-30 PROCEDURE — 96365 THER/PROPH/DIAG IV INF INIT: CPT

## 2024-10-30 PROCEDURE — 87070 CULTURE OTHR SPECIMN AEROBIC: CPT

## 2024-10-30 PROCEDURE — 6360000002 HC RX W HCPCS: Performed by: INTERNAL MEDICINE

## 2024-10-30 PROCEDURE — 81001 URINALYSIS AUTO W/SCOPE: CPT

## 2024-10-30 PROCEDURE — 6360000002 HC RX W HCPCS: Performed by: EMERGENCY MEDICINE

## 2024-10-30 PROCEDURE — 36415 COLL VENOUS BLD VENIPUNCTURE: CPT

## 2024-10-30 PROCEDURE — 87205 SMEAR GRAM STAIN: CPT

## 2024-10-30 PROCEDURE — 80053 COMPREHEN METABOLIC PANEL: CPT

## 2024-10-30 PROCEDURE — 2060000000 HC ICU INTERMEDIATE R&B

## 2024-10-30 PROCEDURE — 74176 CT ABD & PELVIS W/O CONTRAST: CPT

## 2024-10-30 PROCEDURE — 82272 OCCULT BLD FECES 1-3 TESTS: CPT

## 2024-10-30 PROCEDURE — 2580000003 HC RX 258: Performed by: INTERNAL MEDICINE

## 2024-10-30 PROCEDURE — 85025 COMPLETE CBC W/AUTO DIFF WBC: CPT

## 2024-10-30 PROCEDURE — 83690 ASSAY OF LIPASE: CPT

## 2024-10-30 PROCEDURE — 87102 FUNGUS ISOLATION CULTURE: CPT

## 2024-10-30 PROCEDURE — 87075 CULTR BACTERIA EXCEPT BLOOD: CPT

## 2024-10-30 PROCEDURE — 83605 ASSAY OF LACTIC ACID: CPT

## 2024-10-30 PROCEDURE — 85610 PROTHROMBIN TIME: CPT

## 2024-10-30 RX ORDER — NIFEDIPINE 90 MG/1
90 TABLET, FILM COATED, EXTENDED RELEASE ORAL NIGHTLY
Status: CANCELLED | OUTPATIENT
Start: 2024-10-30

## 2024-10-30 RX ORDER — NEPHROCAP 1 MG
1 CAP ORAL DAILY
Status: DISCONTINUED | OUTPATIENT
Start: 2024-10-30 | End: 2024-10-31 | Stop reason: HOSPADM

## 2024-10-30 RX ORDER — METRONIDAZOLE 500 MG/100ML
500 INJECTION, SOLUTION INTRAVENOUS ONCE
Status: COMPLETED | OUTPATIENT
Start: 2024-10-30 | End: 2024-10-30

## 2024-10-30 RX ORDER — METRONIDAZOLE 500 MG/100ML
500 INJECTION, SOLUTION INTRAVENOUS EVERY 8 HOURS
Status: DISCONTINUED | OUTPATIENT
Start: 2024-10-30 | End: 2024-10-31 | Stop reason: HOSPADM

## 2024-10-30 RX ORDER — NIFEDIPINE 30 MG
60 TABLET, EXTENDED RELEASE ORAL NIGHTLY
Status: DISCONTINUED | OUTPATIENT
Start: 2024-10-30 | End: 2024-10-31 | Stop reason: HOSPADM

## 2024-10-30 RX ORDER — TOPIRAMATE 100 MG/1
200 TABLET, FILM COATED ORAL 2 TIMES DAILY
Status: CANCELLED | OUTPATIENT
Start: 2024-10-30

## 2024-10-30 RX ORDER — GENTAMICIN SULFATE 1 MG/G
CREAM TOPICAL DAILY
Status: DISCONTINUED | OUTPATIENT
Start: 2024-10-30 | End: 2024-10-31 | Stop reason: HOSPADM

## 2024-10-30 RX ORDER — ONDANSETRON 4 MG/1
4 TABLET, ORALLY DISINTEGRATING ORAL EVERY 8 HOURS PRN
Status: DISCONTINUED | OUTPATIENT
Start: 2024-10-30 | End: 2024-10-31 | Stop reason: HOSPADM

## 2024-10-30 RX ORDER — CALCITRIOL 0.25 UG/1
2.5 CAPSULE, LIQUID FILLED ORAL DAILY
Status: DISCONTINUED | OUTPATIENT
Start: 2024-10-30 | End: 2024-10-31 | Stop reason: HOSPADM

## 2024-10-30 RX ORDER — SODIUM CHLORIDE 9 MG/ML
INJECTION, SOLUTION INTRAVENOUS CONTINUOUS
Status: DISCONTINUED | OUTPATIENT
Start: 2024-10-30 | End: 2024-10-30

## 2024-10-30 RX ORDER — METOPROLOL SUCCINATE 50 MG/1
50 TABLET, EXTENDED RELEASE ORAL 2 TIMES DAILY
Status: DISCONTINUED | OUTPATIENT
Start: 2024-10-31 | End: 2024-10-30

## 2024-10-30 RX ORDER — SODIUM CHLORIDE 0.9 % (FLUSH) 0.9 %
5-40 SYRINGE (ML) INJECTION PRN
Status: DISCONTINUED | OUTPATIENT
Start: 2024-10-30 | End: 2024-10-31 | Stop reason: HOSPADM

## 2024-10-30 RX ORDER — HYDRALAZINE HYDROCHLORIDE 50 MG/1
25 TABLET, FILM COATED ORAL 2 TIMES DAILY
Status: CANCELLED | OUTPATIENT
Start: 2024-10-30

## 2024-10-30 RX ORDER — SODIUM CHLORIDE 9 MG/ML
INJECTION, SOLUTION INTRAVENOUS PRN
Status: DISCONTINUED | OUTPATIENT
Start: 2024-10-30 | End: 2024-10-31 | Stop reason: HOSPADM

## 2024-10-30 RX ORDER — HEPARIN SODIUM 5000 [USP'U]/ML
5000 INJECTION, SOLUTION INTRAVENOUS; SUBCUTANEOUS 2 TIMES DAILY
Status: CANCELLED | OUTPATIENT
Start: 2024-10-30

## 2024-10-30 RX ORDER — HYDRALAZINE HYDROCHLORIDE 50 MG/1
100 TABLET, FILM COATED ORAL 3 TIMES DAILY
Status: DISCONTINUED | OUTPATIENT
Start: 2024-10-30 | End: 2024-10-31 | Stop reason: HOSPADM

## 2024-10-30 RX ORDER — TORSEMIDE 20 MG/1
20 TABLET ORAL DAILY
Status: DISCONTINUED | OUTPATIENT
Start: 2024-10-30 | End: 2024-10-31 | Stop reason: HOSPADM

## 2024-10-30 RX ORDER — VANCOMYCIN 1.75 G/350ML
1250 INJECTION, SOLUTION INTRAVENOUS ONCE
Status: COMPLETED | OUTPATIENT
Start: 2024-10-30 | End: 2024-10-30

## 2024-10-30 RX ORDER — POLYETHYLENE GLYCOL 3350 17 G/17G
17 POWDER, FOR SOLUTION ORAL DAILY PRN
Status: DISCONTINUED | OUTPATIENT
Start: 2024-10-30 | End: 2024-10-31 | Stop reason: HOSPADM

## 2024-10-30 RX ORDER — NICOTINE 21 MG/24HR
1 PATCH, TRANSDERMAL 24 HOURS TRANSDERMAL DAILY
Status: DISCONTINUED | OUTPATIENT
Start: 2024-10-30 | End: 2024-10-31 | Stop reason: HOSPADM

## 2024-10-30 RX ORDER — SODIUM CHLORIDE 0.9 % (FLUSH) 0.9 %
5-40 SYRINGE (ML) INJECTION EVERY 12 HOURS SCHEDULED
Status: DISCONTINUED | OUTPATIENT
Start: 2024-10-30 | End: 2024-10-31 | Stop reason: HOSPADM

## 2024-10-30 RX ORDER — CALCITRIOL 0.5 UG/1
2.5 CAPSULE, LIQUID FILLED ORAL DAILY
COMMUNITY
Start: 2024-02-20

## 2024-10-30 RX ORDER — SODIUM CHLORIDE, SODIUM LACTATE, CALCIUM CHLORIDE, MAGNESIUM CHLORIDE AND DEXTROSE 2.5; 538; 448; 18.3; 5.08 G/100ML; MG/100ML; MG/100ML; MG/100ML; MG/100ML
2000 INJECTION, SOLUTION INTRAPERITONEAL EVERY 4 HOURS
Status: DISCONTINUED | OUTPATIENT
Start: 2024-10-30 | End: 2024-10-31 | Stop reason: HOSPADM

## 2024-10-30 RX ORDER — SODIUM BICARBONATE 650 MG/1
1300 TABLET ORAL 2 TIMES DAILY
Status: DISCONTINUED | OUTPATIENT
Start: 2024-10-30 | End: 2024-10-30 | Stop reason: SDUPTHER

## 2024-10-30 RX ORDER — SODIUM BICARBONATE 650 MG/1
1300 TABLET ORAL 3 TIMES DAILY
Status: DISCONTINUED | OUTPATIENT
Start: 2024-10-30 | End: 2024-10-31 | Stop reason: HOSPADM

## 2024-10-30 RX ORDER — METOPROLOL SUCCINATE 50 MG/1
50 TABLET, EXTENDED RELEASE ORAL 2 TIMES DAILY
Status: DISCONTINUED | OUTPATIENT
Start: 2024-10-30 | End: 2024-10-31 | Stop reason: HOSPADM

## 2024-10-30 RX ORDER — METOPROLOL SUCCINATE 50 MG/1
50 TABLET, EXTENDED RELEASE ORAL DAILY
Status: DISCONTINUED | OUTPATIENT
Start: 2024-10-31 | End: 2024-10-30

## 2024-10-30 RX ORDER — ONDANSETRON 2 MG/ML
4 INJECTION INTRAMUSCULAR; INTRAVENOUS EVERY 6 HOURS PRN
Status: DISCONTINUED | OUTPATIENT
Start: 2024-10-30 | End: 2024-10-31 | Stop reason: HOSPADM

## 2024-10-30 RX ORDER — LEVOFLOXACIN 5 MG/ML
250 INJECTION, SOLUTION INTRAVENOUS EVERY 24 HOURS
Status: DISCONTINUED | OUTPATIENT
Start: 2024-10-31 | End: 2024-10-31 | Stop reason: HOSPADM

## 2024-10-30 RX ORDER — TOPIRAMATE 200 MG/1
200 TABLET, FILM COATED ORAL 2 TIMES DAILY
COMMUNITY
Start: 2024-09-16

## 2024-10-30 RX ORDER — HYDRALAZINE HYDROCHLORIDE 100 MG/1
100 TABLET, FILM COATED ORAL 3 TIMES DAILY
COMMUNITY
Start: 2024-09-16

## 2024-10-30 RX ORDER — PANTOPRAZOLE SODIUM 40 MG/1
40 TABLET, DELAYED RELEASE ORAL
Status: DISCONTINUED | OUTPATIENT
Start: 2024-10-31 | End: 2024-10-31 | Stop reason: HOSPADM

## 2024-10-30 RX ORDER — LEVOFLOXACIN 5 MG/ML
750 INJECTION, SOLUTION INTRAVENOUS ONCE
Status: COMPLETED | OUTPATIENT
Start: 2024-10-30 | End: 2024-10-30

## 2024-10-30 RX ORDER — TOPIRAMATE 100 MG/1
200 TABLET, FILM COATED ORAL 2 TIMES DAILY
Status: DISCONTINUED | OUTPATIENT
Start: 2024-10-30 | End: 2024-10-31 | Stop reason: HOSPADM

## 2024-10-30 RX ADMIN — SODIUM CHLORIDE, PRESERVATIVE FREE 10 ML: 5 INJECTION INTRAVENOUS at 20:48

## 2024-10-30 RX ADMIN — SODIUM BICARBONATE 1300 MG: 650 TABLET ORAL at 16:08

## 2024-10-30 RX ADMIN — METRONIDAZOLE 500 MG: 500 INJECTION, SOLUTION INTRAVENOUS at 20:47

## 2024-10-30 RX ADMIN — SODIUM CHLORIDE: 9 INJECTION, SOLUTION INTRAVENOUS at 15:25

## 2024-10-30 RX ADMIN — METRONIDAZOLE 500 MG: 500 INJECTION, SOLUTION INTRAVENOUS at 12:16

## 2024-10-30 RX ADMIN — HYDRALAZINE HYDROCHLORIDE 100 MG: 50 TABLET ORAL at 16:08

## 2024-10-30 RX ADMIN — NIFEDIPINE 60 MG: 30 TABLET, EXTENDED RELEASE ORAL at 20:42

## 2024-10-30 RX ADMIN — VANCOMYCIN 1250 MG: 1.75 INJECTION, SOLUTION INTRAVENOUS at 16:17

## 2024-10-30 RX ADMIN — GENTAMICIN SULFATE: 1 CREAM TOPICAL at 16:08

## 2024-10-30 RX ADMIN — LEVOFLOXACIN 750 MG: 5 INJECTION, SOLUTION INTRAVENOUS at 13:28

## 2024-10-30 RX ADMIN — METOPROLOL SUCCINATE 50 MG: 50 TABLET, EXTENDED RELEASE ORAL at 20:50

## 2024-10-30 RX ADMIN — TORSEMIDE 20 MG: 20 TABLET ORAL at 16:08

## 2024-10-30 RX ADMIN — Medication 1 MG: at 16:08

## 2024-10-30 ASSESSMENT — LIFESTYLE VARIABLES
HOW MANY STANDARD DRINKS CONTAINING ALCOHOL DO YOU HAVE ON A TYPICAL DAY: PATIENT DOES NOT DRINK
HOW OFTEN DO YOU HAVE A DRINK CONTAINING ALCOHOL: NEVER

## 2024-10-30 ASSESSMENT — PAIN - FUNCTIONAL ASSESSMENT: PAIN_FUNCTIONAL_ASSESSMENT: NONE - DENIES PAIN

## 2024-10-30 NOTE — CARE COORDINATION
Discharge Planning Note:    Chart reviewed and it appears that patient has minimal needs for discharge at this time. Risk Score na %     Primary Care Physician is Earl Lozada MD  Primary insurance is [facility]    Please notify case management if any discharge needs are identified.      Case management will continue to follow progress and update discharge plan as needed.

## 2024-10-30 NOTE — PROGRESS NOTES
10/30/24 1531   Encounter Summary   Encounter Overview/Reason Initial Encounter   Service Provided For Patient   Referral/Consult From Rounding   Support System Unknown   Last Encounter  10/30/24  (Offered SHS/no needs at this time/CK)   Assessment/Intervention/Outcome   Assessment Unable to assess

## 2024-10-30 NOTE — CONSULTS
Gastrointestinal Consult Note    Patient: Ines Vuong CSN: [id number]     YOB: 1965  Age: 59 y.o.  Sex: female    Unit: [address] Room/Bed: 3215/3215-01 Location: [facility]     Admitting Physician: SAMANTHA COBOS    Date of  Admission: 10/30/2024   Admission type: Emergency  Primary Care Physician: Earl Lozada MD          Referring Physician: [unfilled]    Chief Complaint: Abnormal CT scan  Consult Date: 10/30/2024     Subjective:     History of Present Illness: Ines Vuong is a 59 y.o. female who is seen at the request of SAMANTHA COBOS for abnormal CT scan.    This a very pleasant 59-year-old female who was in her usual state of health until yesterday yesterday she started to experience some very serious abdominal pain    She was not sure what the cause of this abdominal pain was but she had been taking a lot of laxatives    She is on peritoneal dialysis and the dialysis clinic and told her not to get constipated    She was working hard at taking laxatives and then yesterday she just had this acute onset of pain    Somewhere this morning between 4 and 5 AM she woke up and she had 2 large bloody bowel movements she came to the emergency room where she had a CT scan done    The CT scan shows that the patient has inflammation around the distal transverse colon    We were called to see and evaluate the patient    Patient's alcohol blood was    The patient had a hemoglobin of 11.5    Past Medical History:   Diagnosis Date    Chronic kidney disease     Headache(784.0)     Joint pain     Seizures (HCC) 2019    Wears glasses     For driving only     Past Surgical History:   Procedure Laterality Date     SECTION      DIALYSIS CATHETER INSERTION N/A 2023    LAPAROSCOPIC PERITONEAL DIALYSIS CATHETER PLACEMENT, LAPAROSCOPIC OMENTOPEXY performed by Prosper Davis DO at [facility]    FISTULAGRAM        Family History   Problem Relation Age of Onset    Stroke Mother

## 2024-10-30 NOTE — PROGRESS NOTES
Pt verbalized she is not going to take her bowel prep and had family bring in [employer]. Consumed full meal from [employer]. Patient refused nicotine patch, reports active smoking history, and nicotine cravings. Patient stated she is leaving by 1030 tomorrow's date because she will have received enough IV fluid and antibiotics by then, and asked that her IVs and telemetry be removed by 9am. Patient has been educated on her rights to treatment and right to sign out against medical advice. Patient states she believes she needs the antibiotics and will stay overnight for that, then she will be leaving.

## 2024-10-30 NOTE — PROGRESS NOTES
Pt verbalized to this RN she is not willing to do PD at hospital; she will delay until discharging home. Pt also stated she is not doing lab work, she does not want any further workup for rectal bleeding re colonoscopy etc. She will give a fluid sample and urine for testing but nothing further at this time.

## 2024-10-30 NOTE — PROGRESS NOTES
Upon arrival to patient room patient expressed displeasure over admission and wanted to leave. Patient was agreeable to speak to MD in room and agreed to stay for workup. Patient spoke with ED manager and is having friends/family bring in home PD fluids due to nationwide fluid shortage.

## 2024-10-30 NOTE — PROGRESS NOTES
[facility]    Pharmacy Progress Note    Vancomycin - Management by Pharmacy    Consult Date(s): 10/30/24  Consulting Provider(s): Dr. Layton Gross    Assessment / Plan  Intra-Abdominal Infection- Vancomycin  Concurrent Antimicrobials:    Levofloxacin Day #1  Metronidazole Day #1  Day of Vanc Therapy / Ordered Duration: Day #1  Current Dosing Method: Intermittent Dosing by Levels  Therapeutic Goal: Trough 10-15 mg/L  Current Dose / Plan:   Patient is ESRD on PD  Will order vancomycin 1250 mg IV x1 LD (~26 mg/kg)  Will plan to check a level tomorrow AM to assess for re-dosing  Will continue to monitor clinical condition and make adjustments to regimen as appropriate.    Thank you for consulting pharmacy,    Gregory Michele PharmD  [facility]   [id number]  10/30/2024   2:00 PM        Interval update:  Initiation    Subjective/Objective:   Ines Vuong is a 59 y.o. female with a PMHx significant for ESRD on PD, seizure disorder, and hypertension who is admitted with colitis.     Pharmacy is consulted to dose vancomycin.    Ht Readings from Last 1 Encounters:   10/30/24 1.702 m (5' 7\")     Wt Readings from Last 1 Encounters:   10/30/24 47.6 kg (104 lb 14.4 oz)     Current & Prior Antimicrobial Regimen(s):  Levofloxacin 750 mg IV x1 (10/30)  Levofloxacin 250 mg IV q24h (10/30-current)  Metronidazole 500 mg IV q8h (10/30-current)  PTD Vancomycin  Intermittent Dosing Based on Levels    Date Vanc Level Dose   10/30 -- 1250 mg                Cultures & Sensitivities:    Date Site Micro Susceptibility / Result   10/30 Body Fluid Cx Ordered    10/30 Body Fluid Fungus Cx Ordered      Recent Labs     10/30/24  1052   CREATININE 3.3*   BUN 56*   WBC 8.8       Estimated Creatinine Clearance: 14 mL/min (A) (based on SCr of 3.3 mg/dL (H)).    Additional Lab Values / Findings of Note:    No results for input(s): \"PROCAL\" in the last 72 hours.

## 2024-10-30 NOTE — H&P
• Highest education level: None   Tobacco Use   • Smoking status: Every Day     Current packs/day: 1.00     Average packs/day: 1 pack/day for 40.0 years (40.0 ttl pk-yrs)     Types: Cigarettes   • Smokeless tobacco: Never   Vaping Use   • Vaping status: Never Used   Substance and Sexual Activity   • Alcohol use: Never   • Drug use: Never     Social Determinants of Health     Food Insecurity: No Transportation Needs (3/5/2024)    Received from  [facility] [employer], [employer]    Yearly Questionnaire    • Do you need any assistance with obtaining housing, meals, medication, transportation or medical equipment?: No   Transportation Needs: No Transportation Needs (3/5/2024)    [facility] from  [facility]    Yearly Questionnaire    • Do you need any assistance with obtaining housing, meals, medication, transportation or medical equipment?: No    Received from [employer] [facility] [facility] and [facility]   Housing Stability: No Transportation Needs (3/5/2024)    Received from  [facility], [employer]  [facility]    Yearly Questionnaire    • Do you need any assistance with obtaining housing, meals, medication, transportation or medical equipment?: No       Medications:   Medications:   • levofloxacin  750 mg IntraVENous Once   • metroNIDAZOLE  500 mg IntraVENous Once      Infusions:   PRN Meds:      A total of 65 minutes spent in patient encounter    Electronically signed by Layton Gross MD on 10/30/2024 at 12:43 PM    Addendum    Informed by RN that patient is refusing any lab work or peritoneal dialysis.

## 2024-10-30 NOTE — CONSULTS
Nephrology Consult Note                                                                                                                                                                                                                                                                                                                                                               Office : [phone]     Fax :[phone]    Patient's Name: Ines Vuong  2:33 PM  10/30/2024    Reason for Consult:  ESRD on PD  Requesting Physician:  Earl Lozada MD  Chief Complaint:    Chief Complaint   Patient presents with    Rectal Bleeding     Bright red x this am    Abdominal Pain     X 1 day       Assessment/Plan     # ESRD on PD  - Continue CAPD  - Renally dose meds for ESRD  - Monitor renal labs     # HTN  - BP's controlled  - Cont home BP regimen   - Monitor     # Anemia of chronic disease  - Hgb stable  - Monitor     # Acid- base/ Electrolyte imbalance   - Monitor     # Colitis   - GI consulted  - On a full liquid diet   - Abx management per primary       History of Present Ilness:    Ines Vuong is a 59 y.o. female with a PMH of ESRD on PD, seizure disorder, and HTN, who presented to the ER with complaints of abdominal pain and passing bright red blood per rectum x 1 day. Upon arrival to the ER, hemoglobin found to be 11.5. Stool occult blood positive. CT A/P concerning for colitis. We are consulted for ESRD on PD.      Interval hx:        Past Medical History:   Diagnosis Date    Chronic kidney disease     Headache(784.0)     Joint pain     Seizures (HCC) 2019    Wears glasses     For driving only       Past Surgical History:   Procedure Laterality Date     SECTION      DIALYSIS CATHETER INSERTION N/A 2023    LAPAROSCOPIC PERITONEAL DIALYSIS CATHETER PLACEMENT, LAPAROSCOPIC OMENTOPEXY performed by Prosper Davis DO at [facility]    FISTULAGRAM         Family History   Problem Relation Age of Onset

## 2024-10-30 NOTE — PROGRESS NOTES
4 Eyes Skin Assessment     NAME:  Ines Vuong  YOB: 1965  MEDICAL RECORD NUMBER:  [id number]    The patient is being assessed for  Admission    I agree that at least one RN has performed a thorough Head to Toe Skin Assessment on the patient. ALL assessment sites listed below have been assessed.      Areas assessed by both nurses:    Head, Face, Ears, Shoulders, Back, Chest, Arms, Elbows, Hands, Sacrum. Buttock, Coccyx, Ischium, Legs. Feet and Heels, and Under Medical Devices         Does the Patient have a Wound? No noted wound(s)       Tone Prevention initiated by RN: No  Wound Care Orders initiated by RN: No    Pressure Injury (Stage 3,4, Unstageable, DTI, NWPT, and Complex wounds) if present, place Wound referral order by RN under : No    New Ostomies, if present place, Ostomy referral order under : No     Nurse 1 eSignature: Electronically signed by Kell Whatley RN on 10/30/24 at 2:57 PM EDT    **SHARE this note so that the co-signing nurse can place an eSignature**    Nurse 2 eSignature: Electronically signed by Narda Lora RN on 10/30/24 at 2:57 PM EDT

## 2024-10-30 NOTE — ED TRIAGE NOTES
Patient arrives to ED c/o generalized abd pain x 1 day and \"bright red blood in my stool\" that started this am. Patient does home peritoneal dialysis and states her dialysis rn sent her in for xray to ensure her tube is still in place.

## 2024-10-31 VITALS
SYSTOLIC BLOOD PRESSURE: 127 MMHG | DIASTOLIC BLOOD PRESSURE: 73 MMHG | HEART RATE: 68 BPM | OXYGEN SATURATION: 98 % | HEIGHT: 67 IN | WEIGHT: 104.9 LBS | TEMPERATURE: 98.1 F | RESPIRATION RATE: 18 BRPM | BODY MASS INDEX: 16.46 KG/M2

## 2024-10-31 PROBLEM — Z99.2 ESRD ON PERITONEAL DIALYSIS (HCC): Status: ACTIVE | Noted: 2024-10-31

## 2024-10-31 PROBLEM — N18.6 ESRD ON PERITONEAL DIALYSIS (HCC): Status: ACTIVE | Noted: 2024-10-31

## 2024-10-31 LAB
ALBUMIN SERPL-MCNC: 3.8 G/DL (ref 3.4–5)
ALBUMIN/GLOB SERPL: 1.7 {RATIO}
ALP SERPL-CCNC: 102 U/L (ref 40–129)
ALT SERPL-CCNC: 6 U/L (ref 10–40)
ANION GAP SERPL CALCULATED.3IONS-SCNC: 14 MMOL/L (ref 3–16)
AST SERPL-CCNC: 12 U/L (ref 15–37)
BASOPHILS # BLD: 0.02 K/UL (ref 0–0.2)
BASOPHILS NFR BLD: 0 %
BILIRUB SERPL-MCNC: <0.2 MG/DL (ref 0–1)
BUN SERPL-MCNC: 62 MG/DL (ref 7–20)
CALCIUM SERPL-MCNC: 8.7 MG/DL (ref 8.3–10.6)
CHLORIDE SERPL-SCNC: 112 MMOL/L (ref 99–110)
CO2 SERPL-SCNC: 14 MMOL/L (ref 21–32)
CREAT SERPL-MCNC: 3.4 MG/DL (ref 0.6–1.2)
DATE LAST DOSE: NORMAL
EOSINOPHIL # BLD: 0.27 K/UL (ref 0–0.6)
EOSINOPHILS RELATIVE PERCENT: 3 %
ERYTHROCYTE [DISTWIDTH] IN BLOOD BY AUTOMATED COUNT: 13.8 % (ref 12.4–15.4)
GFR, ESTIMATED: 15 ML/MIN/1.73M2
GLUCOSE SERPL-MCNC: 125 MG/DL (ref 70–99)
HCT VFR BLD AUTO: 31.6 % (ref 36–48)
HGB BLD-MCNC: 10.2 G/DL (ref 12–16)
IMM GRANULOCYTES # BLD AUTO: 0.02 K/UL (ref 0–0.5)
IMM GRANULOCYTES NFR BLD: 0 %
LYMPHOCYTES NFR BLD: 1.36 K/UL (ref 1–5.1)
LYMPHOCYTES RELATIVE PERCENT: 15 %
MCH RBC QN AUTO: 32.9 PG (ref 26–34)
MCHC RBC AUTO-ENTMCNC: 32.3 G/DL (ref 31–36)
MCV RBC AUTO: 101.9 FL (ref 80–100)
MICROORGANISM SPEC CULT: ABNORMAL
MICROORGANISM/AGENT SPEC: ABNORMAL
MONOCYTES NFR BLD: 0.66 K/UL (ref 0–1.3)
MONOCYTES NFR BLD: 7 %
NEUTROPHILS NFR BLD: 74 %
NEUTS SEG NFR BLD: 6.74 K/UL (ref 1.7–7.7)
PLATELET # BLD AUTO: 232 K/UL (ref 135–450)
PMV BLD AUTO: 9.5 FL
POTASSIUM SERPL-SCNC: 4.7 MMOL/L (ref 3.5–5.1)
PROT SERPL-MCNC: 6 G/DL (ref 6.4–8.2)
RBC # BLD AUTO: 3.1 M/UL (ref 4–5.2)
SERVICE CMNT-IMP: ABNORMAL
SODIUM SERPL-SCNC: 139 MMOL/L (ref 136–145)
SPECIMEN DESCRIPTION: ABNORMAL
TME LAST DOSE: NORMAL H
VANCOMYCIN DOSE: NORMAL MG
VANCOMYCIN SERPL-MCNC: 17.5 UG/ML
WBC OTHER # BLD: 9.1 K/UL (ref 4–11)

## 2024-10-31 PROCEDURE — 36415 COLL VENOUS BLD VENIPUNCTURE: CPT

## 2024-10-31 PROCEDURE — 2500000003 HC RX 250 WO HCPCS: Performed by: INTERNAL MEDICINE

## 2024-10-31 PROCEDURE — 80202 ASSAY OF VANCOMYCIN: CPT

## 2024-10-31 PROCEDURE — 6360000002 HC RX W HCPCS: Performed by: INTERNAL MEDICINE

## 2024-10-31 PROCEDURE — 6370000000 HC RX 637 (ALT 250 FOR IP): Performed by: INTERNAL MEDICINE

## 2024-10-31 PROCEDURE — 85025 COMPLETE CBC W/AUTO DIFF WBC: CPT

## 2024-10-31 PROCEDURE — 80053 COMPREHEN METABOLIC PANEL: CPT

## 2024-10-31 RX ADMIN — TOPIRAMATE 200 MG: 100 TABLET, FILM COATED ORAL at 08:41

## 2024-10-31 RX ADMIN — HYDRALAZINE HYDROCHLORIDE 100 MG: 50 TABLET ORAL at 08:41

## 2024-10-31 RX ADMIN — SODIUM BICARBONATE 25 MEQ: 84 INJECTION INTRAVENOUS at 06:19

## 2024-10-31 RX ADMIN — METOPROLOL SUCCINATE 50 MG: 50 TABLET, EXTENDED RELEASE ORAL at 08:41

## 2024-10-31 RX ADMIN — METRONIDAZOLE 500 MG: 500 INJECTION, SOLUTION INTRAVENOUS at 05:14

## 2024-10-31 ASSESSMENT — PAIN SCALES - GENERAL
PAINLEVEL_OUTOF10: 3
PAINLEVEL_OUTOF10: 0

## 2024-10-31 ASSESSMENT — PAIN DESCRIPTION - FREQUENCY: FREQUENCY: INTERMITTENT

## 2024-10-31 ASSESSMENT — PAIN - FUNCTIONAL ASSESSMENT: PAIN_FUNCTIONAL_ASSESSMENT: ACTIVITIES ARE NOT PREVENTED

## 2024-10-31 ASSESSMENT — PAIN DESCRIPTION - DESCRIPTORS: DESCRIPTORS: ACHING

## 2024-10-31 ASSESSMENT — PAIN DESCRIPTION - LOCATION: LOCATION: HEAD

## 2024-10-31 ASSESSMENT — PAIN DESCRIPTION - PAIN TYPE: TYPE: ACUTE PAIN

## 2024-10-31 NOTE — PLAN OF CARE
Problem: Discharge Planning  Goal: Discharge to home or other facility with appropriate resources  10/31/2024 0056 by Jignesh Quiroz, RN  Outcome: Progressing  10/30/2024 1517 by Kell Whatley, RN  Outcome: Progressing

## 2024-10-31 NOTE — PROGRESS NOTES
Pt refusing assessment this morning. Only agreeable to take morning hydralazine, metoprolol ad topamax. Did allow this RN to get BP and HR. Pt stating that she is leaving AMA at 0900 and requesting for the paperwork to be signed.MD at bedside to see patient. Pt still insisting to leave AMA stating that she's not sick.    Pt left facility with all belongings.     Addis Goodwin RN

## 2024-10-31 NOTE — PROGRESS NOTES
Patient refused some of her medication. She refuse PD. Said she can go a few days without, she feel fine. Patient ate throughout the night, said she is not having colonoscopy today. Said she is going home.     Patient took of the heart monitor this morning. She said she don't need them anymore, she is going home.

## 2024-10-31 NOTE — DISCHARGE SUMMARY
colonoscopy and decided to leave AGAINST MEDICAL ADVICE.  Patient was educated about the risks of not further evaluating her problem.        Labs: For convenience and continuity at follow-up the following most recent labs are provided:      CBC:   Lab Results   Component Value Date/Time    WBC 9.1 10/31/2024 04:26 AM    HGB 10.2 10/31/2024 04:26 AM    HCT 31.6 10/31/2024 04:26 AM     10/31/2024 04:26 AM       RENAL:   Lab Results   Component Value Date/Time     10/31/2024 04:26 AM    K 4.7 10/31/2024 04:26 AM     10/31/2024 04:26 AM    CO2 14 10/31/2024 04:26 AM    BUN 62 10/31/2024 04:26 AM    CREATININE 3.4 10/31/2024 04:26 AM           Discharge Medications:      Medication List        ASK your doctor about these medications      calcitRIOL 0.5 MCG capsule  Commonly known as: ROCALTROL     hydrALAZINE 100 MG tablet  Commonly known as: APRESOLINE  Ask about: Which instructions should I use?     metoprolol succinate 50 MG extended release tablet  Commonly known as: TOPROL XL     NIFEdipine 60 MG extended release tablet  Commonly known as: ADALAT CC  Ask about: Which instructions should I use?     sodium bicarbonate 650 MG tablet     topiramate 200 MG tablet  Commonly known as: TOPAMAX  Ask about: Which instructions should I use?     torsemide 20 MG tablet  Commonly known as: DEMADEX                 Time Spent on discharge is more than 35 min in the examination, evaluation, counseling   Patient decided to leave AGAINST MEDICAL ADVICE    Signed:  JANNY CORREA MD   10/31/2024      Thank you Earl Lozada MD for the opportunity to be involved in this patient's care. If you have any questions or concerns please feel free to contact me at [phone].    This note was transcribed using Dragon Dictation software. Please disregard any translational errors.

## 2024-11-02 LAB
MICROORGANISM SPEC CULT: NORMAL
MICROORGANISM/AGENT SPEC: NORMAL
SPECIMEN DESCRIPTION: NORMAL

## 2024-11-04 LAB
MICROORGANISM SPEC CULT: ABNORMAL
MICROORGANISM/AGENT SPEC: ABNORMAL
SERVICE CMNT-IMP: ABNORMAL
SPECIMEN DESCRIPTION: ABNORMAL

## 2024-11-10 LAB
MICROORGANISM SPEC CULT: NORMAL
MICROORGANISM/AGENT SPEC: NORMAL
SPECIMEN DESCRIPTION: NORMAL

## 2024-11-17 LAB
MICROORGANISM SPEC CULT: NORMAL
MICROORGANISM/AGENT SPEC: NORMAL
SPECIMEN DESCRIPTION: NORMAL

## 2024-11-24 LAB
MICROORGANISM SPEC CULT: NORMAL
MICROORGANISM/AGENT SPEC: NORMAL
SPECIMEN DESCRIPTION: NORMAL

## 2024-11-29 LAB
MICROORGANISM SPEC CULT: NORMAL
MICROORGANISM/AGENT SPEC: NORMAL
SPECIMEN DESCRIPTION: NORMAL

## (undated) DEVICE — SYRINGE MED 10ML LUERLOCK TIP W/O SFTY DISP

## (undated) DEVICE — TOWEL,STOP FLAG GOLD N-W: Brand: MEDLINE

## (undated) DEVICE — SUTURE VCRL SZ 0 L54IN ABSRB VLT W/O NDL POLYGLACTIN 910 J616H

## (undated) DEVICE — THIS DEVICE IS A DOUBLE SEALING MALE LUER LOCK INTENDED FOR USE WITH THE BAXTER LOCKING TITANIUM ADAPTER FOR PERITONEAL DIALYSIS.: Brand: LOCKING CAP FOR PERITONEAL DIALYSIS CATHETER ADAPTER

## (undated) DEVICE — TROCAR: Brand: KII FIOS FIRST ENTRY

## (undated) DEVICE — SPONGE,DRAIN,NONWVN,4"X4",6PLY,STRL,LF: Brand: MEDLINE

## (undated) DEVICE — GLOVE SURG SZ 75 L12IN THK75MIL DK GRN LTX FREE

## (undated) DEVICE — 3M™ TEGADERM™ TRANSPARENT FILM DRESSING FRAME STYLE, 1628, 6 IN X 8 IN (15 CM X 20 CM), 10/CT 8CT/CASE: Brand: 3M™ TEGADERM™

## (undated) DEVICE — TUBING IRRIG L77IN DIA0.241IN L BOR FOR CYSTO W/ NVENT

## (undated) DEVICE — KIT,ANTI FOG,W/SPONGE & FLUID,SOFT PACK: Brand: MEDLINE

## (undated) DEVICE — AIR/WATER CLEANING ADAPTER FOR OLYMPUS® GI ENDOSCOPE: Brand: BULLDOG®

## (undated) DEVICE — NEEDLE INSUF L120MM DIA2MM DISP FOR PNEUMOPERI ENDOPATH

## (undated) DEVICE — SUTURE VCRL SZ 4-0 L18IN ABSRB UD L19MM PS-2 3/8 CIR PRIM J496H

## (undated) DEVICE — GENERAL LAPAROSCOPIC: Brand: MEDLINE INDUSTRIES, INC.

## (undated) DEVICE — 3M™ IOBAN™ 2 ANTIMICROBIAL INCISE DRAPE 6648EZ: Brand: IOBAN™ 2

## (undated) DEVICE — THIS ADAPTER IS A DOUBLE SEALING FEMALE LUER LOCK ADAPTER WITH A 2-PIECE, COMBINATION COMPRESSION FIT/BARBED CATHETER CONNECTOR. THE ADAPTER IS USED TO CONNECT THE PD CATHETER TO A SOLUTION TRANSFER SET WITH LOCKING CONNECTOR.: Brand: LOCKING TITANIUM ADAPTER FOR PERITONEAL DIALYSIS CATHETER

## (undated) DEVICE — BW-412T DISP COMBO CLEANING BRUSH: Brand: SINGLE USE COMBINATION CLEANING BRUSH

## (undated) DEVICE — PAD PREP L 2 PLY 70% ISO ALC NONWOVEN SFT ABSRB TOP ANTISEP

## (undated) DEVICE — TOTAL TRAY, 16FR 10ML SIL FOLEY, URN: Brand: MEDLINE

## (undated) DEVICE — TUBING, SUCTION, 1/4" X 12', STRAIGHT: Brand: MEDLINE

## (undated) DEVICE — SUTURE VCRL SZ 3-0 L27IN ABSRB UD L26MM SH 1/2 CIR J416H

## (undated) DEVICE — SPONGE GZ W4XL8IN COT WVN 12 PLY

## (undated) DEVICE — SYRINGE, LUER SLIP, STERILE, 60ML: Brand: MEDLINE

## (undated) DEVICE — BINDER ABD UNIV H9IN WAIST 30-45IN E SFT COT PREM 3 PNL

## (undated) DEVICE — SOLUTION IRRIG 3000ML 0.9% SOD CHL USP UROMATIC PLAS CONT

## (undated) DEVICE — SOLUTION IRRIG 1000ML STRL H2O USP PLAS POUR BTL

## (undated) DEVICE — ENDOSCOPIC KIT 1.1+ 6 FT 2 GWN AAMI LEVEL 3

## (undated) DEVICE — SINGLE USE AIR/WATER, SUCTION AND BIOPSY VALVES SET: Brand: ORCAPOD™

## (undated) DEVICE — Z INACTIVE USE 2735373 APPLICATOR FBR LAIN COT WOOD TIP ECONOMICAL

## (undated) DEVICE — GLOVE ORANGE PI 7   MSG9070